# Patient Record
Sex: MALE | Race: WHITE | Employment: OTHER | URBAN - METROPOLITAN AREA
[De-identification: names, ages, dates, MRNs, and addresses within clinical notes are randomized per-mention and may not be internally consistent; named-entity substitution may affect disease eponyms.]

---

## 2017-02-06 ENCOUNTER — TELEPHONE (OUTPATIENT)
Dept: FAMILY MEDICINE CLINIC | Facility: CLINIC | Age: 32
End: 2017-02-06

## 2017-02-06 NOTE — TELEPHONE ENCOUNTER
Pt states that he does not have eye symptoms, but his girlfriend has L eye redness, drainage and itchiness x 2 days. She is not a pt here. I informed him of our walk-in clinics and gave him the address & phone # to the [facility] location.  I did schedule

## 2017-07-19 ENCOUNTER — OFFICE VISIT (OUTPATIENT)
Dept: FAMILY MEDICINE CLINIC | Facility: CLINIC | Age: 32
End: 2017-07-19

## 2017-07-19 VITALS
BODY MASS INDEX: 21.14 KG/M2 | WEIGHT: 170 LBS | DIASTOLIC BLOOD PRESSURE: 73 MMHG | SYSTOLIC BLOOD PRESSURE: 113 MMHG | HEART RATE: 82 BPM | HEIGHT: 75 IN

## 2017-07-19 DIAGNOSIS — J45.21 ASTHMA, ALLERGIC, MILD INTERMITTENT, WITH ACUTE EXACERBATION: ICD-10-CM

## 2017-07-19 DIAGNOSIS — R63.4 WEIGHT LOSS, ABNORMAL: ICD-10-CM

## 2017-07-19 DIAGNOSIS — R42 DIZZINESS: ICD-10-CM

## 2017-07-19 DIAGNOSIS — J30.1 CHRONIC SEASONAL ALLERGIC RHINITIS DUE TO POLLEN: ICD-10-CM

## 2017-07-19 DIAGNOSIS — H10.11 ALLERGIC CONJUNCTIVITIS, ACUTE, RIGHT: Primary | ICD-10-CM

## 2017-07-19 DIAGNOSIS — L81.8 DECORATIVE TATTOO: ICD-10-CM

## 2017-07-19 DIAGNOSIS — F17.200 TOBACCO USE DISORDER: ICD-10-CM

## 2017-07-19 PROCEDURE — 99215 OFFICE O/P EST HI 40 MIN: CPT | Performed by: NURSE PRACTITIONER

## 2017-07-19 RX ORDER — AZELASTINE HYDROCHLORIDE 0.5 MG/ML
1 SOLUTION/ DROPS OPHTHALMIC 2 TIMES DAILY
Qty: 6 ML | Refills: 3 | Status: SHIPPED | OUTPATIENT
Start: 2017-07-19 | End: 2017-12-01 | Stop reason: ALTCHOICE

## 2017-07-19 RX ORDER — FLUTICASONE PROPIONATE 50 MCG
2 SPRAY, SUSPENSION (ML) NASAL DAILY
Qty: 3 BOTTLE | Refills: 3 | Status: SHIPPED | OUTPATIENT
Start: 2017-07-19 | End: 2017-12-01 | Stop reason: ALTCHOICE

## 2017-07-19 RX ORDER — DIVALPROEX SODIUM 500 MG/1
TABLET, EXTENDED RELEASE ORAL
COMMUNITY
Start: 2017-06-19 | End: 2019-02-07

## 2017-07-19 RX ORDER — BENZTROPINE MESYLATE 0.5 MG/1
TABLET ORAL
COMMUNITY
Start: 2017-06-19 | End: 2019-02-07

## 2017-07-19 RX ORDER — MONTELUKAST SODIUM 10 MG/1
10 TABLET ORAL DAILY
Qty: 90 TABLET | Refills: 3 | Status: SHIPPED | OUTPATIENT
Start: 2017-07-19 | End: 2017-12-01 | Stop reason: ALTCHOICE

## 2017-07-19 RX ORDER — ALBUTEROL SULFATE 90 UG/1
2 AEROSOL, METERED RESPIRATORY (INHALATION) EVERY 4 HOURS PRN
Qty: 1 INHALER | Refills: 6 | Status: SHIPPED | OUTPATIENT
Start: 2017-07-19 | End: 2017-12-01 | Stop reason: ALTCHOICE

## 2017-07-19 NOTE — PATIENT INSTRUCTIONS
ALLERGIC RHINITIS    -Take otc allergy medications as directed (over the counter, generic Claritin, Zyrtec or Allegra)    -use of fluticasone nasal spray as advised (Flonase)-this is now available as a generic, over the counter spray (fluticasone) if your Support at home is important too. Nonsmokers can offer praise and encouragement. If the smoker in your life finds it hard to quit, encourage them to keep trying! Over-the-counter medicines  Nicotine replacement therapy may make quitting easier.  Certain ai Date Last Reviewed: 4/28/2015  © 4362-8540 [address], [address] [place], [place] [address]. All rights reserved. This information is not intended as a substitute for professional medical care.  Always follow your healthcare professional · If possible, sleep in a room with no carpet, curtains, or upholstered furniture. Cockroaches:  · Store food in sealed containers. · Remove garbage from the home promptly.   · Fix water leaks  Mold:  · Keep humidity low by using a dehumidifier or air con An asthma attack can be triggered by many things. Common triggers include infections such as the common cold, bronchitis, pneumonia. Irritants such as smoke or pollutants in the air, emotional upset, and exercise can also trigger an attack.  In many adults · Fever of 100.4ºF (38ºC) or higher, or as directed by your healthcare provider  · Coughing up lots of dark-colored or bloody sputum (mucus)  · Chest pain with each breath  · If you use a peak flow meter as part of an Asthma Action Plan, and you are still

## 2017-07-19 NOTE — PROGRESS NOTES
Pt presents for concern over recent weight loss-estimates about 15 pounds in 2 months; has recently stopped smoking marijuana about 2 weeks ago; denies any illicit drug use. Smokes 1/2-1 ppd      Dizziness   This is a new problem.  The current episode start sitting to standing position). Negative for vertigo and headaches.        07/19/17  1225   BP: 113/73   Pulse: 82   Weight: 170 lb (77.1 kg)   Height: 6' 3\" (1.905 m)     Body mass index is 21.25 kg/m².     Past Medical History:   Diagnosis Date   • Antiso every 4 (four) hours as needed for Wheezing.  Disp: 1 Inhaler Rfl: 6   Spacer/Aero Chamber Mouthpiece Does not apply Misc For use in HFA inhaler as needed Disp: 1 each Rfl: 0   BusPIRone HCl 15 MG Oral Tab TK 2 TS PO BID Disp:  Rfl: 2   Lurasidone HCl 20 MG Chronic seasonal allergic rhinitis due to pollen    3. Asthma, allergic, mild intermittent, with acute exacerbation    4. Weight loss, abnormal    5. Tobacco use disorder    6.  Dizziness        Plan:  1. optivar-call if not improving or worsening  2. otc a

## 2017-12-01 ENCOUNTER — OFFICE VISIT (OUTPATIENT)
Dept: INTERNAL MEDICINE CLINIC | Facility: CLINIC | Age: 32
End: 2017-12-01

## 2017-12-01 ENCOUNTER — NURSE TRIAGE (OUTPATIENT)
Dept: OTHER | Age: 32
End: 2017-12-01

## 2017-12-01 VITALS
SYSTOLIC BLOOD PRESSURE: 121 MMHG | BODY MASS INDEX: 24 KG/M2 | TEMPERATURE: 99 F | HEART RATE: 101 BPM | DIASTOLIC BLOOD PRESSURE: 72 MMHG | WEIGHT: 190.69 LBS

## 2017-12-01 DIAGNOSIS — H60.331 ACUTE SWIMMER'S EAR OF RIGHT SIDE: Primary | ICD-10-CM

## 2017-12-01 PROCEDURE — 99214 OFFICE O/P EST MOD 30 MIN: CPT | Performed by: INTERNAL MEDICINE

## 2017-12-01 PROCEDURE — G0463 HOSPITAL OUTPT CLINIC VISIT: HCPCS | Performed by: INTERNAL MEDICINE

## 2017-12-01 RX ORDER — NEOMYCIN SULFATE, POLYMYXIN B SULFATE AND HYDROCORTISONE 10; 3.5; 1 MG/ML; MG/ML; [USP'U]/ML
4 SUSPENSION/ DROPS AURICULAR (OTIC) 4 TIMES DAILY
Qty: 10 ML | Refills: 0 | Status: SHIPPED | OUTPATIENT
Start: 2017-12-01 | End: 2017-12-06

## 2017-12-01 NOTE — TELEPHONE ENCOUNTER
Action Requested: Summary for Provider     []  Critical Lab, Recommendations Needed  [] Need Additional Advice  []   FYI    []   Need Orders  [] Need Medications Sent to Pharmacy  []  Other     SUMMARY:  Patient requesting appt today at [facility] for right earac

## 2017-12-01 NOTE — PROGRESS NOTES
HPI:    Patient ID: Osmin Morris is a 28year old male. Ear Pain    There is pain in the right ear. This is a new problem. The current episode started in the past 7 days (3 days ago). The problem occurs every few minutes.  The problem has been waxing and BusPIRone HCl 15 MG Oral Tab TK 2 TS PO BID Disp:  Rfl: 2   Lurasidone HCl 20 MG Oral Tab Take 20 mg by mouth daily with breakfast. Disp:  Rfl:    Benztropine Mesylate 0.5 MG Oral Tab  Disp:  Rfl:      Allergies:No Known Allergies   PHYSICAL EXAM:   Phys into the right ear 4 (four) times daily. Imaging & Referrals:  None       Id#[id number]    By signing my name below, Beatriz David,  attest that this documentation has been prepared under the direction and in the presence of ELMER Peraza MD.

## 2018-03-21 ENCOUNTER — OFFICE VISIT (OUTPATIENT)
Dept: FAMILY MEDICINE CLINIC | Facility: CLINIC | Age: 33
End: 2018-03-21

## 2018-03-21 VITALS
SYSTOLIC BLOOD PRESSURE: 125 MMHG | BODY MASS INDEX: 24 KG/M2 | DIASTOLIC BLOOD PRESSURE: 73 MMHG | HEART RATE: 85 BPM | WEIGHT: 190 LBS

## 2018-03-21 DIAGNOSIS — L98.9 SKIN LESION OF NECK: Primary | ICD-10-CM

## 2018-03-21 PROCEDURE — 99213 OFFICE O/P EST LOW 20 MIN: CPT | Performed by: FAMILY MEDICINE

## 2018-03-21 PROCEDURE — G0463 HOSPITAL OUTPT CLINIC VISIT: HCPCS | Performed by: FAMILY MEDICINE

## 2018-03-21 RX ORDER — LURASIDONE HYDROCHLORIDE 40 MG/1
1 TABLET, FILM COATED ORAL EVERY MORNING
COMMUNITY
Start: 2018-02-27 | End: 2019-02-07

## 2018-03-21 RX ORDER — MUPIROCIN CALCIUM 20 MG/G
1 CREAM TOPICAL 2 TIMES DAILY
Qty: 15 G | Refills: 1 | Status: SHIPPED | OUTPATIENT
Start: 2018-03-21 | End: 2018-04-04

## 2018-03-21 RX ORDER — AMOXICILLIN AND CLAVULANATE POTASSIUM 875; 125 MG/1; MG/1
1 TABLET, FILM COATED ORAL 2 TIMES DAILY
Qty: 20 TABLET | Refills: 0 | Status: SHIPPED | OUTPATIENT
Start: 2018-03-21 | End: 2018-03-31

## 2018-03-21 NOTE — PROGRESS NOTES
Dwain Hernandez is a 28year old male. Patient presents with:  Lump    HPI:   Felt something behind his left ear about a week ago and picked at it thinking it was a pimple but kept getting bigger and now has pain with it.      Current Outpatient Prescriptions o indicates understanding of these issues and agrees to the plan.       Kelly Miranda MD  3/21/2018  3:10 PM

## 2018-03-23 ENCOUNTER — OFFICE VISIT (OUTPATIENT)
Dept: FAMILY MEDICINE CLINIC | Facility: CLINIC | Age: 33
End: 2018-03-23

## 2018-03-23 VITALS
WEIGHT: 184.25 LBS | HEART RATE: 106 BPM | SYSTOLIC BLOOD PRESSURE: 106 MMHG | HEIGHT: 75 IN | TEMPERATURE: 98 F | DIASTOLIC BLOOD PRESSURE: 69 MMHG | BODY MASS INDEX: 22.91 KG/M2

## 2018-03-23 DIAGNOSIS — L02.11 ABSCESS OF SKIN OF NECK: Primary | ICD-10-CM

## 2018-03-23 DIAGNOSIS — Z23 NEED FOR VACCINATION: ICD-10-CM

## 2018-03-23 PROCEDURE — 90715 TDAP VACCINE 7 YRS/> IM: CPT | Performed by: FAMILY MEDICINE

## 2018-03-23 PROCEDURE — G0463 HOSPITAL OUTPT CLINIC VISIT: HCPCS | Performed by: FAMILY MEDICINE

## 2018-03-23 PROCEDURE — 99214 OFFICE O/P EST MOD 30 MIN: CPT | Performed by: FAMILY MEDICINE

## 2018-03-23 PROCEDURE — 90471 IMMUNIZATION ADMIN: CPT | Performed by: FAMILY MEDICINE

## 2018-03-23 RX ORDER — CYCLOBENZAPRINE HCL 10 MG
10 TABLET ORAL NIGHTLY
Qty: 20 TABLET | Refills: 0 | Status: SHIPPED | OUTPATIENT
Start: 2018-03-23 | End: 2018-04-12

## 2018-03-23 NOTE — PROGRESS NOTES
Patient ID: Shayla Joiner is a 28year old male. HPI  Patient presents with: Follow - Up: Painful Mass on LT side of Neck. Pt believes it may have been an ingrown hair that he picked at in which in turn got infected. He saw Dr. Nick Ford 2 days ago.   He s tablet Rfl: 0   Benztropine Mesylate 0.5 MG Oral Tab  Disp:  Rfl:    divalproex Sodium  MG Oral Tablet 24 Hr  Disp:  Rfl:    BusPIRone HCl 15 MG Oral Tab TK 2 TS PO BID Disp:  Rfl: 2   Lurasidone HCl 20 MG Oral Tab Take 20 mg by mouth daily with jules causes him much pain. He did not want anything such as a \"pain killer\".   Need for vaccination  -     IMMUNIZATION ADMINISTRATION  -     TETANUS, DIPHTHERIA TOXOIDS AND ACELLULAR PERTUSIS VACCINE (TDAP), >7 YEARS, IM USE        Referrals (if applicable)

## 2018-03-23 NOTE — PATIENT INSTRUCTIONS
Do warm compresses on the abscess at least 2 or 3 times a day. Do it for 10-15 minutes at a time. Take a washcloth and wet it. Put it in the microwave for 45 seconds and make sure it is not boiling hot but then put it on the skin.   It should help this d

## 2019-01-10 LAB
ANALYZER ANC (IANC): ABNORMAL
ANION GAP SERPL CALC-SCNC: 13 MMOL/L (ref 10–20)
BASOPHILS # BLD: 0.1 THOUSAND/MCL (ref 0–0.3)
BASOPHILS NFR BLD: 1 %
BUN SERPL-MCNC: 15 MG/DL (ref 6–20)
BUN/CREAT SERPL: 19 (ref 7–25)
CALCIUM SERPL-MCNC: 9.3 MG/DL (ref 8.4–10.2)
CHLORIDE: 100 MMOL/L (ref 98–107)
CO2 SERPL-SCNC: 29 MMOL/L (ref 21–32)
CREAT SERPL-MCNC: 0.81 MG/DL (ref 0.67–1.17)
DIFFERENTIAL METHOD BLD: ABNORMAL
EOSINOPHIL # BLD: 0.1 THOUSAND/MCL (ref 0.1–0.5)
EOSINOPHIL NFR BLD: 1 %
ERYTHROCYTE [DISTWIDTH] IN BLOOD: 12.9 % (ref 11–15)
GLUCOSE SERPL-MCNC: 97 MG/DL (ref 65–99)
HEMATOCRIT: 43.9 % (ref 39–51)
HGB BLD-MCNC: 15 GM/DL (ref 13–17)
IMM GRANULOCYTES # BLD AUTO: 0.1 THOUSAND/MCL (ref 0–0.2)
IMM GRANULOCYTES NFR BLD: 0 %
LYMPHOCYTES # BLD: 2.1 THOUSAND/MCL (ref 1–4.8)
LYMPHOCYTES NFR BLD: 15 %
MCH RBC QN AUTO: 32.6 PG (ref 26–34)
MCHC RBC AUTO-ENTMCNC: 34.2 GM/DL (ref 32–36.5)
MCV RBC AUTO: 95.4 FL (ref 78–100)
MONOCYTES # BLD: 1.3 THOUSAND/MCL (ref 0.3–0.9)
MONOCYTES NFR BLD: 9 %
NEUTROPHILS # BLD: 10.4 THOUSAND/MCL (ref 1.8–7.7)
NEUTROPHILS NFR BLD: 74 %
NEUTS SEG NFR BLD: ABNORMAL %
NRBC (NRBCRE): 0 /100 WBC
PLATELET # BLD: 258 THOUSAND/MCL (ref 140–450)
POTASSIUM SERPL-SCNC: 4 MMOL/L (ref 3.4–5.1)
RBC # BLD: 4.6 MILLION/MCL (ref 4.5–5.9)
SODIUM SERPL-SCNC: 138 MMOL/L (ref 135–145)
WBC # BLD: 14 THOUSAND/MCL (ref 4.2–11)

## 2019-01-11 ENCOUNTER — HOSPITAL (OUTPATIENT)
Dept: OTHER | Age: 34
End: 2019-01-11
Attending: INTERNAL MEDICINE

## 2019-01-11 LAB
ANALYZER ANC (IANC): ABNORMAL
ANION GAP SERPL CALC-SCNC: 14 MMOL/L (ref 10–20)
BUN SERPL-MCNC: 14 MG/DL (ref 6–20)
BUN/CREAT SERPL: 18 (ref 7–25)
CALCIUM SERPL-MCNC: 8.7 MG/DL (ref 8.4–10.2)
CHLORIDE: 102 MMOL/L (ref 98–107)
CO2 SERPL-SCNC: 26 MMOL/L (ref 21–32)
CREAT SERPL-MCNC: 0.79 MG/DL (ref 0.67–1.17)
ERYTHROCYTE [DISTWIDTH] IN BLOOD: 13 % (ref 11–15)
GLUCOSE SERPL-MCNC: 93 MG/DL (ref 65–99)
HEMATOCRIT: 40.1 % (ref 39–51)
HGB BLD-MCNC: 13.7 GM/DL (ref 13–17)
MCH RBC QN AUTO: 32.5 PG (ref 26–34)
MCHC RBC AUTO-ENTMCNC: 34.2 GM/DL (ref 32–36.5)
MCV RBC AUTO: 95 FL (ref 78–100)
NRBC (NRBCRE): 0 /100 WBC
PLATELET # BLD: 219 THOUSAND/MCL (ref 140–450)
POTASSIUM SERPL-SCNC: 3.9 MMOL/L (ref 3.4–5.1)
RBC # BLD: 4.22 MILLION/MCL (ref 4.5–5.9)
SODIUM SERPL-SCNC: 138 MMOL/L (ref 135–145)
WBC # BLD: 12.9 THOUSAND/MCL (ref 4.2–11)

## 2019-01-12 LAB
ANALYZER ANC (IANC): ABNORMAL
ERYTHROCYTE [DISTWIDTH] IN BLOOD: 12.9 % (ref 11–15)
HEMATOCRIT: 39.9 % (ref 39–51)
HGB BLD-MCNC: 13.5 GM/DL (ref 13–17)
MCH RBC QN AUTO: 32.2 PG (ref 26–34)
MCHC RBC AUTO-ENTMCNC: 33.8 GM/DL (ref 32–36.5)
MCV RBC AUTO: 95.2 FL (ref 78–100)
NRBC (NRBCRE): 0 /100 WBC
PLATELET # BLD: 207 THOUSAND/MCL (ref 140–450)
RBC # BLD: 4.19 MILLION/MCL (ref 4.5–5.9)
VANCOMYCIN TROUGH SERPL-MCNC: 6.9 MCG/ML (ref 10–20)
WBC # BLD: 11.6 THOUSAND/MCL (ref 4.2–11)

## 2019-02-07 DIAGNOSIS — F32.A DEPRESSIVE DISORDER: ICD-10-CM

## 2019-02-07 DIAGNOSIS — F41.9 ANXIETY: ICD-10-CM

## 2019-02-07 DIAGNOSIS — F31.62 BIPOLAR DISORDER, CURRENT EPISODE MIXED, MODERATE (HCC): ICD-10-CM

## 2019-02-07 NOTE — PROGRESS NOTES
Patient ID: Wilfred Rodriguez is a 35year old male. HPI  Patient presents with:  Bipolar: med refill     I last saw him in March 2018.   He states he was going to the [place] [address] psychiatrist but he missed 2 visits and is no longer able t Benign skin cyst     per [employer]:   \"benign cyst onLT side of face\"   • B[facility] disorder ([facility]    • Narcissistic personality disorder [facility])        Past Surgical History:   Procedure Laterality Date   • DERMATOLOGICAL PROCEDURE Left     per [employer]:  \"ex Tab; Take 1 tablet (40 mg total) by mouth 2 (two) times daily.  -     Benztropine Mesylate 1 MG Oral Tab; Take 1 tablet (1 mg total) by mouth 2 (two) times daily.  -     BusPIRone HCl 15 MG Oral Tab;  Take 2 tablets (30 mg total) by mouth 2 (two) times sukhjinder Requested Specialty:Psychiatry          Number of Visits Requested:3        Follow up if symptoms persist.  Take medicine (if given) as prescribed. Approach to treatment discussed and patient/family member understands and agrees to plan.      No Fol

## 2019-02-08 ENCOUNTER — TELEPHONE (OUTPATIENT)
Dept: PEDIATRICS CLINIC | Facility: CLINIC | Age: 34
End: 2019-02-08

## 2019-02-08 RX ORDER — BENZTROPINE MESYLATE 1 MG/1
TABLET ORAL
Qty: 180 TABLET | Refills: 0 | OUTPATIENT
Start: 2019-02-08

## 2019-02-08 RX ORDER — BUSPIRONE HYDROCHLORIDE 15 MG/1
TABLET ORAL
Qty: 360 TABLET | Refills: 0 | OUTPATIENT
Start: 2019-02-08

## 2019-02-08 RX ORDER — DIVALPROEX SODIUM 500 MG/1
TABLET, EXTENDED RELEASE ORAL
Qty: 180 TABLET | Refills: 0 | OUTPATIENT
Start: 2019-02-08

## 2019-02-09 NOTE — TELEPHONE ENCOUNTER
PA for Latuda 40 mg tab completed with [employer] via CMM response time 24-72 hours KEY [id number].

## 2019-03-13 DIAGNOSIS — F31.62 BIPOLAR DISORDER, CURRENT EPISODE MIXED, MODERATE (HCC): ICD-10-CM

## 2019-03-13 DIAGNOSIS — F41.9 ANXIETY: ICD-10-CM

## 2019-03-13 DIAGNOSIS — F32.A DEPRESSIVE DISORDER: ICD-10-CM

## 2019-03-14 RX ORDER — DIVALPROEX SODIUM 500 MG/1
TABLET, EXTENDED RELEASE ORAL
Qty: 60 TABLET | Refills: 0 | Status: SHIPPED | OUTPATIENT
Start: 2019-03-14

## 2019-03-28 DIAGNOSIS — F41.9 ANXIETY: ICD-10-CM

## 2019-03-28 DIAGNOSIS — F32.A DEPRESSIVE DISORDER: ICD-10-CM

## 2019-03-28 DIAGNOSIS — F31.62 BIPOLAR DISORDER, CURRENT EPISODE MIXED, MODERATE (HCC): ICD-10-CM

## 2019-03-28 RX ORDER — LURASIDONE HYDROCHLORIDE 40 MG/1
TABLET, FILM COATED ORAL
Qty: 60 TABLET | Refills: 0 | Status: SHIPPED | OUTPATIENT
Start: 2019-03-28 | End: 2019-07-26

## 2019-03-29 NOTE — TELEPHONE ENCOUNTER
Refill passed per [facility] protocol.     Refill Protocol Appointment Criteria  · Appointment scheduled in the past 6 months or in the next 3 months  Recent Outpatient Visits            1 month ago Bipolar disorder, current episode mixed, moderate (HC

## 2019-04-22 ENCOUNTER — TELEPHONE (OUTPATIENT)
Dept: FAMILY MEDICINE CLINIC | Facility: CLINIC | Age: 34
End: 2019-04-22

## 2019-04-22 NOTE — TELEPHONE ENCOUNTER
Patient was at [facility] on April 17, 2019. Present with anxiety. He arrived with bad anxiety for the past 6 months. He was inpatient 2 weeks prior.   He stated he had an appointment with the [facility] on Friday to reestablish psychiat

## 2019-06-12 ENCOUNTER — NURSE TRIAGE (OUTPATIENT)
Dept: FAMILY MEDICINE CLINIC | Facility: CLINIC | Age: 34
End: 2019-06-12

## 2019-06-12 ENCOUNTER — OFFICE VISIT (OUTPATIENT)
Dept: FAMILY MEDICINE CLINIC | Facility: CLINIC | Age: 34
End: 2019-06-12
Payer: MEDICARE

## 2019-06-12 VITALS
DIASTOLIC BLOOD PRESSURE: 71 MMHG | HEART RATE: 83 BPM | BODY MASS INDEX: 23.93 KG/M2 | HEIGHT: 75 IN | WEIGHT: 192.44 LBS | SYSTOLIC BLOOD PRESSURE: 108 MMHG

## 2019-06-12 DIAGNOSIS — K04.7 TOOTH ABSCESS: Primary | ICD-10-CM

## 2019-06-12 PROCEDURE — 99213 OFFICE O/P EST LOW 20 MIN: CPT | Performed by: NURSE PRACTITIONER

## 2019-06-12 PROCEDURE — G0463 HOSPITAL OUTPT CLINIC VISIT: HCPCS | Performed by: NURSE PRACTITIONER

## 2019-06-12 RX ORDER — BUSPIRONE HYDROCHLORIDE 15 MG/1
TABLET ORAL
Refills: 0 | COMMUNITY
Start: 2019-05-09 | End: 2021-04-07

## 2019-06-12 RX ORDER — AMOXICILLIN 500 MG/1
1000 TABLET, FILM COATED ORAL 2 TIMES DAILY
Qty: 28 TABLET | Refills: 0 | Status: SHIPPED | OUTPATIENT
Start: 2019-06-12 | End: 2019-06-19

## 2019-06-12 NOTE — PROGRESS NOTES
HPI    Patient presents for toothache x 4 days. Bottom right wisdom tooth next to wisdom; #18. Has been bothering him on and off for the past year and half. States that it needs to be extracted but currently doesn't have dental insurance.   In the proces education level: Not on file    Occupational History      Not on file    Social Needs      Financial resource strain: Not on file      Food insecurity:        Worry: Not on file        Inability: Not on file      Transportation needs:        Medical: Not o defibrillator: No        Reaction to local anesthetic: No    Social History Narrative      Not on file        Current Outpatient Medications:  busPIRone HCl 15 MG Oral Tab TK 1 T PO BID Disp:  Rfl: 0   amoxicillin 500 MG Oral Tab Take 2 tablets (1,000 mg t

## 2019-06-12 NOTE — TELEPHONE ENCOUNTER
Action Requested: Summary for Provider     []  Critical Lab, Recommendations Needed  [] Need Additional Advice  []   FYI    []   Need Orders  [] Need Medications Sent to Pharmacy  []  Other     SUMMARY: Since pt unwilling to pay to see a dentist, per blanca

## 2019-07-26 ENCOUNTER — HOSPITAL ENCOUNTER (OUTPATIENT)
Age: 34
Discharge: HOME | End: 2019-07-26
Attending: EMERGENCY MEDICINE
Payer: MEDICARE

## 2019-07-26 VITALS
WEIGHT: 206 LBS | HEART RATE: 93 BPM | BODY MASS INDEX: 26 KG/M2 | DIASTOLIC BLOOD PRESSURE: 63 MMHG | OXYGEN SATURATION: 97 % | RESPIRATION RATE: 18 BRPM | TEMPERATURE: 99 F | SYSTOLIC BLOOD PRESSURE: 97 MMHG

## 2019-07-26 DIAGNOSIS — K04.7 DENTAL ABSCESS: Primary | ICD-10-CM

## 2019-07-26 PROCEDURE — 99204 OFFICE O/P NEW MOD 45 MIN: CPT

## 2019-07-26 PROCEDURE — 99213 OFFICE O/P EST LOW 20 MIN: CPT

## 2019-07-26 RX ORDER — NAPROXEN 500 MG/1
500 TABLET ORAL 2 TIMES DAILY WITH MEALS
Qty: 20 TABLET | Refills: 0 | Status: SHIPPED | OUTPATIENT
Start: 2019-07-26 | End: 2019-08-02

## 2019-07-26 RX ORDER — AMOXICILLIN 875 MG/1
875 TABLET, COATED ORAL 2 TIMES DAILY
Qty: 20 TABLET | Refills: 0 | Status: SHIPPED | OUTPATIENT
Start: 2019-07-26 | End: 2019-08-05

## 2019-07-26 NOTE — ED INITIAL ASSESSMENT (HPI)
Pt states pain to the right sided tooth pain for 3 years. Pt has had issues with insurance. No fever. Pt states worsening in the last 2 weeks.

## 2019-07-26 NOTE — ED PROVIDER NOTES
Patient Seen in: [facility] In [address] [place] [address]    History   Patient presents with:  Dental Problem (dental)    Stated Complaint: rt mouth pain/ear pain    KAY    Rannie Felty is a 29year old male who presents for evaluation and management and vital signs reviewed. All other systems reviewed and negative except as noted above. PSFH elements reviewed from today and agreed except as otherwise stated in HPI.     Physical Exam     ED Triage Vitals [07/26/19 1753]   BP 97/63   Pulse 93   R MG Oral Tab  Take 1 tablet (500 mg total) by mouth 2 (two) times daily with meals for 7 days.   Qty: 20 tablet Refills: 0

## 2019-09-28 ENCOUNTER — TELEPHONE (OUTPATIENT)
Dept: OTHER | Age: 34
End: 2019-09-28

## 2019-09-30 ENCOUNTER — OFFICE VISIT (OUTPATIENT)
Dept: INTERNAL MEDICINE CLINIC | Facility: CLINIC | Age: 34
End: 2019-09-30
Payer: MEDICARE

## 2019-09-30 VITALS
OXYGEN SATURATION: 95 % | WEIGHT: 181.88 LBS | SYSTOLIC BLOOD PRESSURE: 126 MMHG | TEMPERATURE: 98 F | DIASTOLIC BLOOD PRESSURE: 80 MMHG | HEART RATE: 108 BPM | HEIGHT: 71 IN | BODY MASS INDEX: 25.46 KG/M2

## 2019-09-30 DIAGNOSIS — K04.7 INFECTED TOOTH: Primary | ICD-10-CM

## 2019-09-30 PROCEDURE — 99213 OFFICE O/P EST LOW 20 MIN: CPT | Performed by: PHYSICIAN ASSISTANT

## 2019-09-30 PROCEDURE — G0463 HOSPITAL OUTPT CLINIC VISIT: HCPCS | Performed by: PHYSICIAN ASSISTANT

## 2019-09-30 RX ORDER — AMOXICILLIN AND CLAVULANATE POTASSIUM 875; 125 MG/1; MG/1
1 TABLET, FILM COATED ORAL 2 TIMES DAILY
Qty: 20 TABLET | Refills: 0 | Status: SHIPPED | OUTPATIENT
Start: 2019-09-30 | End: 2019-10-02 | Stop reason: ALTCHOICE

## 2019-09-30 NOTE — PROGRESS NOTES
HPI:    Patient ID: Ana Rosa Rodriguez is a 29year old male. HPI   patient presents with right sided cheek and tooth pain. Has had this for a few weeks know. Worried he has a tooth infection. Does not have dental insurance at this time.  Denies any fevers but (1.803 m)   Wt 181 lb 14.4 oz (82.5 kg)   SpO2 95%   BMI 25.37 kg/m²   Body mass index is 25.37 kg/m². Physical Exam    Constitutional: He is oriented to person, place, and time. He appears well-developed and well-nourished.    HENT:   Head: Normocephalic

## 2019-10-02 ENCOUNTER — LAB ENCOUNTER (OUTPATIENT)
Dept: LAB | Age: 34
End: 2019-10-02
Attending: FAMILY MEDICINE
Payer: MEDICARE

## 2019-10-02 DIAGNOSIS — F31.62 BIPOLAR DISORDER, CURRENT EPISODE MIXED, MODERATE (HCC): ICD-10-CM

## 2019-10-02 DIAGNOSIS — Z11.3 SCREENING EXAMINATION FOR VENEREAL DISEASE: ICD-10-CM

## 2019-10-02 PROCEDURE — 86780 TREPONEMA PALLIDUM: CPT

## 2019-10-02 PROCEDURE — 86803 HEPATITIS C AB TEST: CPT

## 2019-10-02 PROCEDURE — 36415 COLL VENOUS BLD VENIPUNCTURE: CPT

## 2019-10-02 PROCEDURE — 80164 ASSAY DIPROPYLACETIC ACD TOT: CPT

## 2019-10-02 PROCEDURE — 87491 CHLMYD TRACH DNA AMP PROBE: CPT

## 2019-10-02 PROCEDURE — 80053 COMPREHEN METABOLIC PANEL: CPT

## 2019-10-02 PROCEDURE — 87389 HIV-1 AG W/HIV-1&-2 AB AG IA: CPT

## 2019-10-02 PROCEDURE — 87591 N.GONORRHOEAE DNA AMP PROB: CPT

## 2019-10-02 NOTE — PROGRESS NOTES
Donna Pearson is a 29year old male. Patient presents with:  Testing  Jaw Pain    HPI:   New to me. Dr. Hansen Began patient. He will be seeing him for physical. He is concerned about his liver and hepatitis C -girlfriend with Hepatitis C.  Has been treated for y masses, HSM or tenderness  EXTREMITIES: no cyanosis, clubbing or edema      ASSESSMENT AND PLAN:   1. Bipolar disorder, current episode mixed, moderate ([facility]  Follow up with Dr. Monty Noble for his physical.   - VALPROIC ACID, (DEPAKENE);  Future  - COMP METABOL

## 2019-10-03 NOTE — PROGRESS NOTES
[address] thus far are negative including hepatitis C.  It is negative. - Dr. Nick Ford

## 2020-03-12 NOTE — PROGRESS NOTES
HPI    Patient presents for urgent care follow up. Was seen yesterday and diagnosed with a uri and cough. Was given a z pack to take for symptoms. Having trouble with eating due to a lot of anxiety.   Has previously been diagnosed with unspecified bi Highest education level: Not on file    Occupational History      Not on file    Social Needs      Financial resource strain: Not on file      Food insecurity:        Worry: Not on file        Inability: Not on file      Transportation needs:        Irene Tate No        Pt has a defibrillator: No        Reaction to local anesthetic: No    Social History Narrative      Not on file      Current Outpatient Medications   Medication Sig Dispense Refill   • azithromycin 250 MG Oral Tab      • benzonatate 200 MG Oral C normal. Judgment and thought content normal.       Assessment and Plan:   Problem List Items Addressed This Visit        Mental Health    Bipolar disorder, current episode mixed, moderate ([facility] - Primary    Relevant Orders    OP REFERRAL TO PSYCHIATRY

## 2020-03-19 ENCOUNTER — TELEPHONE (OUTPATIENT)
Dept: FAMILY MEDICINE CLINIC | Facility: CLINIC | Age: 35
End: 2020-03-19

## 2020-03-19 NOTE — TELEPHONE ENCOUNTER
Patient states he's uncertain if he still has an appointment with MICHAEL Lane on 3/21 or not. Informed patient I would route to her and that he may have a telephone visit. Patient states \"rapid weight loss. \"

## 2020-03-19 NOTE — TELEPHONE ENCOUNTER
Pt should not come in for visit on Saturday as to limit exposure to pts and staff. I am willing to do a telephone visit and create orders to start investigating weight loss.

## 2020-03-19 NOTE — TELEPHONE ENCOUNTER
Pt scheduled appt through [email or web address] with following sx:    Visit Type: MYCHART EXAM (2964)      3/21/2020   10:45 AM  15 mins. MIKE Aviles  [facility]      Patient Comments:   Rapid weight loss(30lbs) in a few months, pains in chest, physi

## 2020-03-19 NOTE — TELEPHONE ENCOUNTER
We are recommending that pts try to socially isolate self. I will call and do a telephone encounter.

## 2020-03-19 NOTE — TELEPHONE ENCOUNTER
Patient calling in follow up to his recent appointment where it was noticed that his weight is \"30 pounds down. \" He is concerned and is requesting further testing.  He is stating he has been vaping for a long time and is worried he may be having sequelae

## 2020-03-20 ENCOUNTER — TELEPHONE (OUTPATIENT)
Dept: FAMILY MEDICINE CLINIC | Facility: CLINIC | Age: 35
End: 2020-03-20

## 2020-03-20 NOTE — TELEPHONE ENCOUNTER
Pt was called to complete travel screening and c/o cough, fever of 101 temp, please advice  Appt was canceled

## 2020-03-20 NOTE — TELEPHONE ENCOUNTER
Patient sched appt w Dr. Paul Wagoner for 3/23 with chest pain and on and off fever. Appt canceled due to symptoms but please triage pt accordingly. Sent to triage b.c chest pain.

## 2020-03-20 NOTE — TELEPHONE ENCOUNTER
Left message to call back on pt's cell phone, also left call back message with family member at home phone number, family member states pt is at work at this time.

## 2020-03-21 ENCOUNTER — TELEPHONE (OUTPATIENT)
Dept: INTERNAL MEDICINE CLINIC | Facility: CLINIC | Age: 35
End: 2020-03-21

## 2020-03-31 NOTE — TELEPHONE ENCOUNTER
It looks like he was already called by Drea Graff and then told to call back if his symptoms continued.

## 2021-04-07 ENCOUNTER — HOSPITAL ENCOUNTER (EMERGENCY)
Facility: HOSPITAL | Age: 36
Discharge: HOME | End: 2021-04-07
Attending: EMERGENCY MEDICINE
Payer: MEDICARE

## 2021-04-07 ENCOUNTER — APPOINTMENT (OUTPATIENT)
Dept: GENERAL RADIOLOGY | Facility: HOSPITAL | Age: 36
End: 2021-04-07
Attending: EMERGENCY MEDICINE
Payer: MEDICARE

## 2021-04-07 VITALS
SYSTOLIC BLOOD PRESSURE: 123 MMHG | HEART RATE: 75 BPM | OXYGEN SATURATION: 99 % | HEIGHT: 74 IN | DIASTOLIC BLOOD PRESSURE: 71 MMHG | WEIGHT: 175 LBS | RESPIRATION RATE: 17 BRPM | BODY MASS INDEX: 22.46 KG/M2 | TEMPERATURE: 98 F

## 2021-04-07 DIAGNOSIS — R07.9 ACUTE CHEST PAIN: Primary | ICD-10-CM

## 2021-04-07 PROCEDURE — 93010 ELECTROCARDIOGRAM REPORT: CPT | Performed by: EMERGENCY MEDICINE

## 2021-04-07 PROCEDURE — 71045 X-RAY EXAM CHEST 1 VIEW: CPT | Performed by: EMERGENCY MEDICINE

## 2021-04-07 PROCEDURE — 93005 ELECTROCARDIOGRAM TRACING: CPT

## 2021-04-07 PROCEDURE — 85379 FIBRIN DEGRADATION QUANT: CPT | Performed by: EMERGENCY MEDICINE

## 2021-04-07 PROCEDURE — 84484 ASSAY OF TROPONIN QUANT: CPT | Performed by: EMERGENCY MEDICINE

## 2021-04-07 PROCEDURE — 85025 COMPLETE CBC W/AUTO DIFF WBC: CPT | Performed by: EMERGENCY MEDICINE

## 2021-04-07 PROCEDURE — 99284 EMERGENCY DEPT VISIT MOD MDM: CPT

## 2021-04-07 PROCEDURE — 83735 ASSAY OF MAGNESIUM: CPT | Performed by: EMERGENCY MEDICINE

## 2021-04-07 PROCEDURE — 80048 BASIC METABOLIC PNL TOTAL CA: CPT | Performed by: EMERGENCY MEDICINE

## 2021-04-07 PROCEDURE — 36415 COLL VENOUS BLD VENIPUNCTURE: CPT

## 2021-04-07 RX ORDER — ASPIRIN 81 MG/1
TABLET, CHEWABLE ORAL DAILY
COMMUNITY

## 2021-04-07 NOTE — ED PROVIDER NOTES
Patient Seen in: [facility] Emergency Department      History   Patient presents with:  Arrythmia/Palpitations    Stated Complaint: cp    HPI/Subjective:   HPI    77-year-old male presents for evaluation for chest pain.   Patient states that the pa other systems reviewed and negative except as noted above.     Physical Exam     ED Triage Vitals   BP 04/07/21 1342 121/77   Pulse 04/07/21 1342 83   Resp 04/07/21 1342 18   Temp 04/07/21 1342 98.4 °F (36.9 °C)   Temp src 04/07/21 1342 Oral   SpO2 04/07/21 Behavior: Behavior normal.               ED Course     Labs Reviewed   BASIC METABOLIC PANEL (8) - Abnormal; Notable for the following components:       Result Value    Glucose 124 (*)     All other components within normal limits   TROPONIN I - Normal   M abnormalities. No effusion or pleural thickening. BONES: No fracture or visible bony lesion. OTHER: Negative. CONCLUSION: Normal examination.      Dictated by (CST): Froilan Parikh MD on 4/07/2021 at 2:25 PM     Finalized by (CST): Jewel

## 2021-04-07 NOTE — ED INITIAL ASSESSMENT (HPI)
Pt to ED with c/o intermittent chest discomfort. Pt denies cough or fever. No respiratory distress noted. 98% on room air. Pt is alert and oriented x4. Pt skin parameters WNL. Pt with hx of anxiety and bipolar.

## 2024-05-17 NOTE — TELEPHONE ENCOUNTER
Patient said he has pink eye. Red, itchy, crusty in the am.   Jessi Dumonts he is sure it is pink eye and with his work schedule   He cannot come in.       [facility] [address] [place] [address], [phone], [phone]- lower abdomen/complains of pain/discomfort

## 2025-07-08 NOTE — TELEPHONE ENCOUNTER
Advised patient of Dr. Roque Najera note. Patient verbalized understanding. States he tried to go to [facility] but the wait was too long so he left.  States still having same symptoms and knows he needs to see a dentist to get too extracted but doesn't have dental insura
JESUS on home and Cell numbers
Message noted.
Message noted. Agree with triage advice given.
Noted.
Previously awaiting call back for appt, noted today pt made an appt for 10/2/19 4:15pm with Dr Abelino John, appt made via [address]
Pt's girlfriend called stating pt having tooth pain, informed girlfriend no KAVITA in place so need to talk to pt. Pt refused to talk to RN, hung up on RN. Called pt back, pt gave verbal permission to talk to mom and girlfirend.  Talked with mom and then pt's
Unknown if ever smoked

## (undated) NOTE — LETTER
3/12/2020          To Whom It May Concern:    Carmina Rivera is currently under my medical care and may not return to work at this time. Please excuse [place] for 2 days. He may return to work on 3/13/2020. Activity is restricted as follows: none.     If y

## (undated) NOTE — LETTER
3/23/2018              Hilario Maria C        [address] [place] [id number]         To whom it may concern,    Altha Severe is currently a patient under my medical care.   Patient was seen today for illness and he has to be out of work at this time